# Patient Record
Sex: MALE | Race: BLACK OR AFRICAN AMERICAN | Employment: STUDENT | URBAN - METROPOLITAN AREA
[De-identification: names, ages, dates, MRNs, and addresses within clinical notes are randomized per-mention and may not be internally consistent; named-entity substitution may affect disease eponyms.]

---
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
## 2021-10-22 ENCOUNTER — APPOINTMENT (OUTPATIENT)
Dept: GENERAL RADIOLOGY | Facility: HOSPITAL | Age: 21
End: 2021-10-22
Payer: COMMERCIAL

## 2021-10-22 ENCOUNTER — HOSPITAL ENCOUNTER (EMERGENCY)
Facility: HOSPITAL | Age: 21
Discharge: HOME | End: 2021-10-22
Attending: EMERGENCY MEDICINE
Payer: COMMERCIAL

## 2021-10-22 VITALS
BODY MASS INDEX: 23 KG/M2 | DIASTOLIC BLOOD PRESSURE: 84 MMHG | TEMPERATURE: 98 F | RESPIRATION RATE: 16 BRPM | WEIGHT: 151 LBS | HEART RATE: 49 BPM | SYSTOLIC BLOOD PRESSURE: 129 MMHG | OXYGEN SATURATION: 100 %

## 2021-10-22 DIAGNOSIS — S63.641A SPRAIN OF METACARPOPHALANGEAL (MCP) JOINT OF RIGHT THUMB, INITIAL ENCOUNTER: Primary | ICD-10-CM

## 2021-10-22 PROCEDURE — 99283 EMERGENCY DEPT VISIT LOW MDM: CPT

## 2021-10-22 PROCEDURE — 73130 X-RAY EXAM OF HAND: CPT

## 2021-10-22 RX ORDER — IBUPROFEN 600 MG/1
600 TABLET ORAL ONCE
Status: DISCONTINUED | OUTPATIENT
Start: 2021-10-22 | End: 2021-10-22

## 2021-10-22 RX ORDER — ALBUTEROL SULFATE 90 UG/1
2 AEROSOL, METERED RESPIRATORY (INHALATION) EVERY 6 HOURS PRN
COMMUNITY

## 2021-10-22 NOTE — ED PROVIDER NOTES
Patient Seen in: [facility] Emergency Department      History   Patient presents with:  Arm or Hand Injury    Stated Complaint: rigth hand pain     Subjective:   HPI    This is a 45-year-old male complaining of a right thumb injury 2 days ago while EXTREMITIES: Peripheral pulses are brisk in all 4 extremities. Normal capillary refill. There is tenderness at the base of the right thumb as well as at the distal metacarpal and the metacarpal phalangeal joint.   There is no other bony point tenderness (MCP) joint of right thumb, initial encounter  (primary encounter diagnosis)     Disposition:  Discharge  10/22/2021 11:06 am    Follow-up:  [place], [facility] [address]  [phone]    Schedule an appointment as britney

## 2021-11-01 ENCOUNTER — HOSPITAL ENCOUNTER (EMERGENCY)
Facility: HOSPITAL | Age: 21
Discharge: HOME | End: 2021-11-01
Attending: EMERGENCY MEDICINE
Payer: COMMERCIAL

## 2021-11-01 ENCOUNTER — HOSPITAL ENCOUNTER (OUTPATIENT)
Facility: HOSPITAL | Age: 21
Setting detail: OBSERVATION
Discharge: HOME | End: 2021-11-02
Attending: EMERGENCY MEDICINE | Admitting: STUDENT IN AN ORGANIZED HEALTH CARE EDUCATION/TRAINING PROGRAM
Payer: COMMERCIAL

## 2021-11-01 ENCOUNTER — APPOINTMENT (OUTPATIENT)
Dept: CT IMAGING | Facility: HOSPITAL | Age: 21
End: 2021-11-01
Attending: EMERGENCY MEDICINE
Payer: COMMERCIAL

## 2021-11-01 VITALS
RESPIRATION RATE: 16 BRPM | DIASTOLIC BLOOD PRESSURE: 60 MMHG | WEIGHT: 150 LBS | TEMPERATURE: 99 F | BODY MASS INDEX: 22.73 KG/M2 | HEART RATE: 53 BPM | HEIGHT: 68 IN | SYSTOLIC BLOOD PRESSURE: 123 MMHG | OXYGEN SATURATION: 100 %

## 2021-11-01 VITALS
BODY MASS INDEX: 22.73 KG/M2 | HEART RATE: 59 BPM | WEIGHT: 150 LBS | SYSTOLIC BLOOD PRESSURE: 118 MMHG | RESPIRATION RATE: 16 BRPM | HEIGHT: 68 IN | DIASTOLIC BLOOD PRESSURE: 56 MMHG | TEMPERATURE: 98 F | OXYGEN SATURATION: 99 %

## 2021-11-01 DIAGNOSIS — R51.9 INTRACTABLE HEADACHE, UNSPECIFIED CHRONICITY PATTERN, UNSPECIFIED HEADACHE TYPE: Primary | ICD-10-CM

## 2021-11-01 DIAGNOSIS — G43.809 OTHER MIGRAINE WITHOUT STATUS MIGRAINOSUS, NOT INTRACTABLE: Primary | ICD-10-CM

## 2021-11-01 PROCEDURE — 99284 EMERGENCY DEPT VISIT MOD MDM: CPT

## 2021-11-01 PROCEDURE — 70450 CT HEAD/BRAIN W/O DYE: CPT | Performed by: EMERGENCY MEDICINE

## 2021-11-01 PROCEDURE — 96374 THER/PROPH/DIAG INJ IV PUSH: CPT

## 2021-11-01 PROCEDURE — 99219 INITIAL OBSERVATION CARE,LEVL II: CPT | Performed by: STUDENT IN AN ORGANIZED HEALTH CARE EDUCATION/TRAINING PROGRAM

## 2021-11-01 PROCEDURE — 96375 TX/PRO/DX INJ NEW DRUG ADDON: CPT

## 2021-11-01 PROCEDURE — 96361 HYDRATE IV INFUSION ADD-ON: CPT

## 2021-11-01 PROCEDURE — 99244 OFF/OP CNSLTJ NEW/EST MOD 40: CPT | Performed by: OTHER

## 2021-11-01 RX ORDER — DIPHENHYDRAMINE HYDROCHLORIDE 50 MG/ML
25 INJECTION INTRAMUSCULAR; INTRAVENOUS ONCE
Status: COMPLETED | OUTPATIENT
Start: 2021-11-01 | End: 2021-11-01

## 2021-11-01 RX ORDER — BISACODYL 10 MG
10 SUPPOSITORY, RECTAL RECTAL
Status: DISCONTINUED | OUTPATIENT
Start: 2021-11-01 | End: 2021-11-02

## 2021-11-01 RX ORDER — KETOROLAC TROMETHAMINE 30 MG/ML
15 INJECTION, SOLUTION INTRAMUSCULAR; INTRAVENOUS ONCE
Status: COMPLETED | OUTPATIENT
Start: 2021-11-01 | End: 2021-11-01

## 2021-11-01 RX ORDER — DIPHENHYDRAMINE HYDROCHLORIDE 50 MG/ML
50 INJECTION INTRAMUSCULAR; INTRAVENOUS ONCE
Status: COMPLETED | OUTPATIENT
Start: 2021-11-01 | End: 2021-11-01

## 2021-11-01 RX ORDER — KETOROLAC TROMETHAMINE 30 MG/ML
30 INJECTION, SOLUTION INTRAMUSCULAR; INTRAVENOUS EVERY 6 HOURS PRN
Status: DISCONTINUED | OUTPATIENT
Start: 2021-11-01 | End: 2021-11-02

## 2021-11-01 RX ORDER — BUTALBITAL, ACETAMINOPHEN AND CAFFEINE 50; 325; 40 MG/1; MG/1; MG/1
1-2 TABLET ORAL EVERY 6 HOURS PRN
Status: DISCONTINUED | OUTPATIENT
Start: 2021-11-01 | End: 2021-11-01

## 2021-11-01 RX ORDER — METOCLOPRAMIDE HYDROCHLORIDE 5 MG/ML
10 INJECTION INTRAMUSCULAR; INTRAVENOUS ONCE
Status: COMPLETED | OUTPATIENT
Start: 2021-11-01 | End: 2021-11-01

## 2021-11-01 RX ORDER — PROCHLORPERAZINE EDISYLATE 5 MG/ML
5 INJECTION INTRAMUSCULAR; INTRAVENOUS EVERY 8 HOURS PRN
Status: DISCONTINUED | OUTPATIENT
Start: 2021-11-01 | End: 2021-11-02

## 2021-11-01 RX ORDER — SODIUM CHLORIDE 9 MG/ML
INJECTION, SOLUTION INTRAVENOUS CONTINUOUS
Status: ACTIVE | OUTPATIENT
Start: 2021-11-01 | End: 2021-11-01

## 2021-11-01 RX ORDER — DEXAMETHASONE SODIUM PHOSPHATE 4 MG/ML
4 VIAL (ML) INJECTION EVERY 8 HOURS
Status: DISCONTINUED | OUTPATIENT
Start: 2021-11-02 | End: 2021-11-01

## 2021-11-01 RX ORDER — MAGNESIUM SULFATE 1 G/100ML
1 INJECTION INTRAVENOUS ONCE
Status: COMPLETED | OUTPATIENT
Start: 2021-11-01 | End: 2021-11-01

## 2021-11-01 RX ORDER — BUTALBITAL, ACETAMINOPHEN AND CAFFEINE 50; 325; 40 MG/1; MG/1; MG/1
1 TABLET ORAL EVERY 6 HOURS PRN
Status: DISCONTINUED | OUTPATIENT
Start: 2021-11-01 | End: 2021-11-02

## 2021-11-01 RX ORDER — POLYETHYLENE GLYCOL 3350 17 G/17G
17 POWDER, FOR SOLUTION ORAL DAILY PRN
Status: DISCONTINUED | OUTPATIENT
Start: 2021-11-01 | End: 2021-11-02

## 2021-11-01 RX ORDER — BUTALBITAL, ACETAMINOPHEN AND CAFFEINE 50; 325; 40 MG/1; MG/1; MG/1
2 TABLET ORAL EVERY 6 HOURS PRN
Status: DISCONTINUED | OUTPATIENT
Start: 2021-11-01 | End: 2021-11-02

## 2021-11-01 RX ORDER — FAMOTIDINE 10 MG/ML
20 INJECTION, SOLUTION INTRAVENOUS ONCE
Status: COMPLETED | OUTPATIENT
Start: 2021-11-01 | End: 2021-11-01

## 2021-11-01 RX ORDER — SODIUM PHOSPHATE, DIBASIC AND SODIUM PHOSPHATE, MONOBASIC 7; 19 G/133ML; G/133ML
1 ENEMA RECTAL ONCE AS NEEDED
Status: DISCONTINUED | OUTPATIENT
Start: 2021-11-01 | End: 2021-11-02

## 2021-11-01 RX ORDER — ONDANSETRON 2 MG/ML
4 INJECTION INTRAMUSCULAR; INTRAVENOUS EVERY 6 HOURS PRN
Status: DISCONTINUED | OUTPATIENT
Start: 2021-11-01 | End: 2021-11-02

## 2021-11-01 RX ORDER — DEXAMETHASONE SODIUM PHOSPHATE 4 MG/ML
4 VIAL (ML) INJECTION EVERY 6 HOURS
Status: DISCONTINUED | OUTPATIENT
Start: 2021-11-01 | End: 2021-11-01

## 2021-11-01 RX ORDER — ACETAMINOPHEN 325 MG/1
650 TABLET ORAL EVERY 6 HOURS PRN
Status: DISCONTINUED | OUTPATIENT
Start: 2021-11-01 | End: 2021-11-02

## 2021-11-01 NOTE — CONSULTS
Neurology H&P    Consuelo Alfaro Patient Status:  Observation    2000 MRN [id number]   [facility] 3NW-A Attending Dorota Hernandez MD   Hosp Day # 0 PCP Cody Herzog     Subjective:  Consuelo Alfaro is a(n) 22year old male who presents to 68\", weight 150 lb (68 kg), SpO2 97 %.     Gen: Awake and in no apparent distress  HEENT: moist mucus membranes  Neck: Supple  Cardiovascular: Regular rate and rhythm, no murmur  Pulm: CTAB  GI: non-tender, normal bowel sounds  Skin: normal, dry  Extremiti headache. No migraine features. [facility] reviewed. Nothing that would explain his HA. As this is new ion onset and keeps returning I will get an MRI of the Brain. He can continue VPA 500mg BID and Fioricet for breakthrough headaches. Plan:  1.  Headache  -

## 2021-11-01 NOTE — ED INITIAL ASSESSMENT (HPI)
Pt reports headache on the right side of his head with light sensitivity. Pt denies any vision changes, nausea. Reports hx of migraines and concussion from playing sports.

## 2021-11-01 NOTE — ED PROVIDER NOTES
Patient Seen in: [facility] Emergency Department      History   Patient presents with:  Headache    Stated Complaint: Seen last night and discharged for a headache, headache persists     Subjective:   HPI    This is a 23-year-old male with a medical 11/01/21 1017 129/71   Pulse 11/01/21 1017 55   Resp 11/01/21 1017 18   Temp 11/01/21 1017 97.9 °F (36.6 °C)   Temp src --    SpO2 11/01/21 1017 100 %   O2 Device 11/01/21 1241 None (Room air)       Current:/55   Pulse (!) 49   Temp 97.9 °F (36.6 °C) 10.64 (*)     Neutrophil Absolute 10.64 (*)     Monocyte Absolute 1.17 (*)     All other components within normal limits   LIPASE - Normal   RAPID SARS-COV-2 BY PCR - Normal   CBC WITH DIFFERENTIAL WITH PLATELET    Narrative:      The following orders were

## 2021-11-01 NOTE — H&P
[facility]  History and Physical     Diane Ho Patient Status:  Emergency    2000 MRN [id number]   Location [facility] Attending Antonio Arguello MD   Hosp Day # 0 PCP Rosalio Rashida     Chief Complaint: i positives and negatives noted in the HPI. Physical Exam:    /55   Pulse (!) 49   Temp 97.9 °F (36.6 °C)   Resp 16   Ht 5' 8\" (1.727 m)   Wt 150 lb (68 kg)   SpO2 100%   BMI 22.81 kg/m²   General: No acute distress. Alert and oriented x 3.   HEENT: vern.     Quality:  · DVT Prophylaxis: SCD   · CODE status: full  · Anthony: no  · If COVID testing is negative, may discontinue isolation: yes      Plan of care discussed with pt, pt mother    Tish Amaro MD  11/1/2021

## 2021-11-01 NOTE — ED INITIAL ASSESSMENT (HPI)
Pt seen last night for headache on the right temple. Pt sensitive to light and sound. Pt took motrin 800 mg, tylenol 1000mg.

## 2021-11-01 NOTE — PLAN OF CARE
Pt A&O x's 4, admitted from ER with headache. Went to ER early in am and was sent home, but awoke with headache still 10/10 and shaking. Pt has had concussion in past from wrestling; last one 3 yrs ago. CT head (-) for any acute changes.   PRN pain meds Free from fall injury  Description: INTERVENTIONS:  - Assess pt frequently for physical needs  - Identify cognitive and physical deficits and behaviors that affect risk of falls.   - [place] fall precautions as indicated by assessment.  - Educate pt/famil for assistance (call light)  - Provide an  as needed  - Communicate barriers and strategies to overcome with those who interact with patient  Outcome: Progressing

## 2021-11-01 NOTE — ED PROVIDER NOTES
Patient Seen in: [facility] Emergency Department      History   Patient presents with:  Headache    Stated Complaint: headache since last night, hx of concussions, denies injury/trauma    Subjective:   HPI    52-year-old male comes to the hospital c nontender nondistended normal active bowel sounds without rebound, guarding or masses noted  Back nontender without CVA tenderness  Extremity no clubbing, cyanosis or edema noted.   Full range of motion noted without tenderness  Neuro: No focal deficits not Discharge Medication List

## 2021-11-02 PROCEDURE — 99213 OFFICE O/P EST LOW 20 MIN: CPT | Performed by: OTHER

## 2021-11-02 RX ORDER — BUTALBITAL, ACETAMINOPHEN AND CAFFEINE 50; 325; 40 MG/1; MG/1; MG/1
1 TABLET ORAL EVERY 4 HOURS PRN
Qty: 20 TABLET | Refills: 0 | Status: SHIPPED | OUTPATIENT
Start: 2021-11-02

## 2021-11-02 NOTE — PROGRESS NOTES
Pt alert and orient x 4. Pt has easy non labored breathing on ra. Vs wnl. Pt denies any pain or nausea. rac hl. Voids adequate amount. Up ad jesse. Steady gait. Plan of care discussed. All questions answered. Instructed to call if any needs arise.  Call light

## 2021-11-02 NOTE — PROGRESS NOTES
Writer of note was informed that while in another  room pt asked other staff member about walking around in hallway. Staff gave permission for pt to wall in hallway. Pt ambulate in hallway and left unit via stairs.  Writer of note called pts cell phone pt i

## 2021-11-02 NOTE — PROGRESS NOTES
Neurology Progress Note    [place] Patient Status:  Observation    2000 MRN [id number]   [facility] 3NW-A Attending Hector Tadeo MD   Hosp Day # 0 PCP Christiano Hughes       Chief Complaint:  Headache      Subjective:  Pt was s adjacent to the right transverse sinus.       3. Findings concerning for sinusitis involving the frontal sinuses, ethmoid air cells, and maxillary sinuses.  Please correlate with patient's signs and symptoms to evaluate for acute versus chronic sinusitis.

## 2021-11-02 NOTE — PROGRESS NOTES
Writer of note informed that pt wants iv out and that would like to leave ama. Charge nurse Sherwin Hernandez went into pt's room. Pt informed of importance iv being in place, and that some the medication that was ordered was iv.  Pt agreed to stay as long as iv was

## 2021-11-02 NOTE — PROGRESS NOTES
Vss. Pt resting in bed this am. Pt states his headache is better this am and denies any nausea, blurred vision, or photo sensitivity. Pt tolerating diet well. Denies other pain. Ambulating in room without difficulty. Voiding with good output.  Iv removed ea

## 2021-11-02 NOTE — PHYSICAL THERAPY NOTE
PT orders recd, chart reviewed. Per [facility] [place] staff, pt is ind with mobility, up amb ad jesse, IPPT not indicated. Will dc current orders.

## 2021-11-02 NOTE — PROGRESS NOTES
NURSING DISCHARGE NOTE    Discharged Home via Wheelchair. Accompanied by Support staff  Belongings Taken by patient/family. Discharge instructions reviewed with patient and family.  Iv removed earlier this am. rx for Fioricet sent to patient's pharm

## 2021-11-03 NOTE — DISCHARGE SUMMARY
[facility]  DISCHARGE SUMMARY     Claude Herrlich Patient Status:  Observation    2000 MRN [id number]   [facility] 3NW-A Attending No att. providers found   Hosp Day # 0 PCP Rhina Banerjee     Date of Admission: 2021  Date o known as: FIORICET  Notes to patient: -take with food      Take 1 tablet by mouth every 4 (four) hours as needed for Headaches.    Quantity: 20 tablet  Refills: 0        CONTINUE taking these medications      Instructions Prescription details   albuterol 10

## 2022-01-16 ENCOUNTER — HOSPITAL ENCOUNTER (EMERGENCY)
Facility: HOSPITAL | Age: 22
Discharge: HOME | End: 2022-01-16
Attending: EMERGENCY MEDICINE
Payer: COMMERCIAL

## 2022-01-16 ENCOUNTER — APPOINTMENT (OUTPATIENT)
Dept: GENERAL RADIOLOGY | Facility: HOSPITAL | Age: 22
End: 2022-01-16
Attending: EMERGENCY MEDICINE
Payer: COMMERCIAL

## 2022-01-16 VITALS
DIASTOLIC BLOOD PRESSURE: 78 MMHG | BODY MASS INDEX: 24 KG/M2 | OXYGEN SATURATION: 100 % | WEIGHT: 160.25 LBS | TEMPERATURE: 98 F | RESPIRATION RATE: 16 BRPM | SYSTOLIC BLOOD PRESSURE: 133 MMHG | HEART RATE: 62 BPM

## 2022-01-16 DIAGNOSIS — S62.355A CLOSED NONDISPLACED FRACTURE OF SHAFT OF FOURTH METACARPAL BONE OF LEFT HAND, INITIAL ENCOUNTER: Primary | ICD-10-CM

## 2022-01-16 PROCEDURE — 99284 EMERGENCY DEPT VISIT MOD MDM: CPT

## 2022-01-16 PROCEDURE — 73130 X-RAY EXAM OF HAND: CPT | Performed by: EMERGENCY MEDICINE

## 2022-01-16 NOTE — ED PROVIDER NOTES
Patient Seen in: [facility] Emergency Department      History   Patient presents with:  Arm or Hand Injury    Stated Complaint: left hand injury yesterday while wrestling    Subjective:   HPI    Yasmani Borrego is a 52-year-old who presents for evaluation o 72.7 kg   SpO2 100%   BMI 24.37 kg/m²         Physical Exam    GENERAL: The patient is alert and in no acute distress. The patient is well appearing and interactive. HEENT: Head is normocephalic.   Oropharynx shows moist mucous membranes with no erythema 01/16/22  1054   BP: 133/78   Pulse: 62   Resp: 16   Temp: 98 °F (36.7 °C)   TempSrc: Temporal   SpO2: 100%   Weight: 72.7 kg       Chart review:  Epic chart review was performed and all relevant PCP or ED visits, as well as hospitalizations, were assessed of shaft of fourth metacarpal bone of left hand, initial encounter  (primary encounter diagnosis)     Disposition:  Discharge  1/16/2022 11:43 am    Follow-up:  Regino Baker, [address] [address] [phone]    Schedule an

## 2023-08-03 ENCOUNTER — HOSPITAL ENCOUNTER (OUTPATIENT)
Age: 23
Discharge: HOME | End: 2023-08-03
Payer: COMMERCIAL

## 2023-08-03 VITALS
RESPIRATION RATE: 16 BRPM | TEMPERATURE: 98 F | SYSTOLIC BLOOD PRESSURE: 133 MMHG | WEIGHT: 154 LBS | OXYGEN SATURATION: 100 % | HEIGHT: 68 IN | DIASTOLIC BLOOD PRESSURE: 73 MMHG | BODY MASS INDEX: 23.34 KG/M2 | HEART RATE: 60 BPM

## 2023-08-03 DIAGNOSIS — J01.00 ACUTE NON-RECURRENT MAXILLARY SINUSITIS: Primary | ICD-10-CM

## 2023-08-03 LAB — SARS-COV-2 RNA RESP QL NAA+PROBE: NOT DETECTED

## 2023-08-03 PROCEDURE — U0002 COVID-19 LAB TEST NON-CDC: HCPCS | Performed by: NURSE PRACTITIONER

## 2023-08-03 PROCEDURE — 99203 OFFICE O/P NEW LOW 30 MIN: CPT | Performed by: NURSE PRACTITIONER

## 2023-08-03 RX ORDER — AMOXICILLIN AND CLAVULANATE POTASSIUM 875; 125 MG/1; MG/1
1 TABLET, FILM COATED ORAL 2 TIMES DAILY
Qty: 20 TABLET | Refills: 0 | Status: SHIPPED | OUTPATIENT
Start: 2023-08-03 | End: 2023-08-13

## 2023-08-03 NOTE — DISCHARGE INSTRUCTIONS
Take a decongestant such as Mucinex D or Sudafed for the next week and if symptoms persist then take the antibiotic as directed.

## 2023-08-03 NOTE — ED INITIAL ASSESSMENT (HPI)
Pt presents with sinus pressure x 2-3 days. Subjective fevers. No medication taken today for pain. No cough or sore throat.

## (undated) NOTE — LETTER
May 20, 2019    Patient: Elkin Wadsworth   Date of Visit: 5/20/2019       To Whom It May Concern:    Elkin Wadsworth was seen and treated in our emergency department on 5/20/2019. He should not participate in gym/sports until Cleared by physician.   He sustai

## (undated) NOTE — LETTER
Date & Time: 1/16/2022, 11:43 AM  Patient: Lucinda Quevedo  Encounter Provider(s):    Michel Tavares MD       To Whom It May Concern:    Lucinda Quevedo was seen and treated in our department on 1/16/2022.  He may return to school with no wrestling or strenuous

## (undated) NOTE — LETTER
11/02/21    Miki Lares      To Whom It May Concern:     This letter has been written at the patient's request. The above patient was seen at [facility] for treatment of a medical condition from 10/31/21-11/2/21    The patient may return to work/[facility] [place]

## (undated) NOTE — LETTER
[facility] C[facility] AT [facility] [address]  [address] [facility] [address]  Dept: [phone]  Dept Fax: [phone]         December 23, 2018    Patient: Liam Lopes   YOB: 2000   Date of Visit: 12/23/2018       To Whom It May

## (undated) NOTE — ED AVS SNAPSHOT
Iker Darci   MRN: [id number]    Department:  [facility] Emergency Department   Date of Visit:  5/20/2019           Disclosure     Insurance plans vary and the physician(s) referred by the ER may not be covered by your plan.  Please contact your i tell this physician (or your personal doctor if your instructions are to return to your personal doctor) about any new or lasting problems. The primary care or specialist physician will see patients referred from the [facility] Emergency Department.  Cheryle Levins